# Patient Record
Sex: MALE | Employment: FULL TIME | URBAN - METROPOLITAN AREA
[De-identification: names, ages, dates, MRNs, and addresses within clinical notes are randomized per-mention and may not be internally consistent; named-entity substitution may affect disease eponyms.]

---

## 2020-02-14 ENCOUNTER — HOSPITAL ENCOUNTER (OUTPATIENT)
Dept: CARDIAC REHAB | Age: 40
Discharge: HOME | End: 2020-02-14
Payer: COMMERCIAL

## 2020-02-14 VITALS — BODY MASS INDEX: 27.05 KG/M2 | HEIGHT: 69 IN | WEIGHT: 182.6 LBS

## 2020-02-14 PROCEDURE — 93798 PHYS/QHP OP CAR RHAB W/ECG: CPT

## 2020-02-14 RX ORDER — CARVEDILOL 3.12 MG/1
3.12 TABLET ORAL 2 TIMES DAILY WITH MEALS
COMMUNITY

## 2020-02-14 RX ORDER — ASPIRIN 81 MG/1
TABLET ORAL DAILY
COMMUNITY
End: 2022-05-24

## 2020-02-14 RX ORDER — ATORVASTATIN CALCIUM 80 MG/1
80 TABLET, FILM COATED ORAL DAILY
COMMUNITY

## 2020-02-14 RX ORDER — NITROGLYCERIN 0.4 MG/1
0.4 TABLET SUBLINGUAL
COMMUNITY

## 2020-02-14 RX ORDER — METOPROLOL TARTRATE 25 MG/1
TABLET, FILM COATED ORAL 2 TIMES DAILY
COMMUNITY
End: 2022-05-24

## 2020-02-14 NOTE — CARDIO/PULMONARY
Cardiopulmonary Rehab Orientation:     Met with for the initial session. Mr Keli Payne is a 37year-old patient of Dr. Wendy King at [facility], who presents to rehab for cardiac conditioning and strengthening, S/P NSTEMI/stent 1/15/2020. Pt was out of town in [facility] and exhibited chest tightness/ pain. He was stented at [facility] in [place], [place] by Dr. Jaziel Garcia,  LVEF 60% (per pt). History also includes CAD. STEMI/stent, CABG (2016), diverticulitis HTN, HLD. Allergies to Peptobismal, and ibuprofen. Immunization for influenza vaccine UTD. Pt is nonsmoker. He drinks beer 2-3 x week. Lungs were CTA; denied any cough. No LE edema was present. Exercise at home includes [employer] bike program. However he has not exercised since NSTEMI last month    Limitations: none. Patient was given an educational notebook and viewed the cardiac rehab DVD. Psychosocial: Pt denies stress. He enjoys playing music (guitar, piano) he also enjoys reading for stress refief. Pt scored a 1 on PHQ-9 depression  Screening tool. BMI 27.02 , based on height of 5'9\" and weight 182. 6 Lbs. .  TELE: SB/NSR, BBB  Pt completed 6MW on treadmill with no difficulty to 570 meters, METS 3.7. Patient's identified goals are:   1. Lose weight (now 182 lbs)  2. Return to high intensity exercises with a target -140.        Plan: Return for first exercise session on 2/18/2020

## 2020-02-18 ENCOUNTER — HOSPITAL ENCOUNTER (OUTPATIENT)
Dept: CARDIAC REHAB | Age: 40
Discharge: HOME | End: 2020-02-18
Payer: COMMERCIAL

## 2020-02-18 VITALS — BODY MASS INDEX: 26.97 KG/M2 | WEIGHT: 182.6 LBS

## 2020-02-18 PROCEDURE — 93798 PHYS/QHP OP CAR RHAB W/ECG: CPT

## 2020-02-21 ENCOUNTER — HOSPITAL ENCOUNTER (OUTPATIENT)
Dept: CARDIAC REHAB | Age: 40
Discharge: HOME | End: 2020-02-21
Payer: COMMERCIAL

## 2020-02-21 VITALS — WEIGHT: 182 LBS | BODY MASS INDEX: 26.88 KG/M2

## 2020-02-21 PROCEDURE — 93798 PHYS/QHP OP CAR RHAB W/ECG: CPT

## 2020-02-25 ENCOUNTER — HOSPITAL ENCOUNTER (OUTPATIENT)
Dept: CARDIAC REHAB | Age: 40
Discharge: HOME | End: 2020-02-25
Payer: COMMERCIAL

## 2020-02-25 VITALS — WEIGHT: 182.4 LBS | BODY MASS INDEX: 26.94 KG/M2

## 2020-02-25 PROCEDURE — 93798 PHYS/QHP OP CAR RHAB W/ECG: CPT

## 2020-02-28 ENCOUNTER — HOSPITAL ENCOUNTER (OUTPATIENT)
Dept: CARDIAC REHAB | Age: 40
Discharge: HOME | End: 2020-02-28
Payer: COMMERCIAL

## 2020-02-28 VITALS — WEIGHT: 183.2 LBS | BODY MASS INDEX: 27.05 KG/M2

## 2020-02-28 PROCEDURE — 93798 PHYS/QHP OP CAR RHAB W/ECG: CPT

## 2020-03-03 ENCOUNTER — HOSPITAL ENCOUNTER (OUTPATIENT)
Dept: CARDIAC REHAB | Age: 40
Discharge: HOME | End: 2020-03-03
Payer: COMMERCIAL

## 2020-03-03 VITALS — WEIGHT: 183.8 LBS | BODY MASS INDEX: 27.14 KG/M2

## 2020-03-03 PROCEDURE — 93798 PHYS/QHP OP CAR RHAB W/ECG: CPT

## 2020-03-05 ENCOUNTER — HOSPITAL ENCOUNTER (OUTPATIENT)
Dept: CT IMAGING | Age: 40
Discharge: HOME | End: 2020-03-05
Attending: INTERNAL MEDICINE
Payer: COMMERCIAL

## 2020-03-05 DIAGNOSIS — I25.10 CORONARY ARTERIOSCLEROSIS: ICD-10-CM

## 2020-03-05 DIAGNOSIS — R93.3 ABNORMAL FINDINGS ON DIAGNOSTIC IMAGING OF OTHER PARTS OF DIGESTIVE TRACT: ICD-10-CM

## 2020-03-05 DIAGNOSIS — K57.32 DIVERTICULITIS, COLON: ICD-10-CM

## 2020-03-05 LAB — CREAT BLD-MCNC: 0.8 MG/DL (ref 0.6–1.3)

## 2020-03-05 PROCEDURE — 74177 CT ABD & PELVIS W/CONTRAST: CPT

## 2020-03-05 PROCEDURE — 74011000255 HC RX REV CODE- 255: Performed by: INTERNAL MEDICINE

## 2020-03-05 PROCEDURE — 74011636320 HC RX REV CODE- 636/320: Performed by: INTERNAL MEDICINE

## 2020-03-05 PROCEDURE — 82565 ASSAY OF CREATININE: CPT

## 2020-03-05 RX ORDER — SODIUM CHLORIDE 0.9 % (FLUSH) 0.9 %
10 SYRINGE (ML) INJECTION
Status: COMPLETED | OUTPATIENT
Start: 2020-03-05 | End: 2020-03-05

## 2020-03-05 RX ORDER — BARIUM SULFATE 20 MG/ML
900 SUSPENSION ORAL
Status: COMPLETED | OUTPATIENT
Start: 2020-03-05 | End: 2020-03-05

## 2020-03-05 RX ADMIN — Medication 10 ML: at 14:56

## 2020-03-05 RX ADMIN — IOPAMIDOL 100 ML: 755 INJECTION, SOLUTION INTRAVENOUS at 14:55

## 2020-03-05 RX ADMIN — BARIUM SULFATE 900 ML: 21 SUSPENSION ORAL at 14:55

## 2020-03-06 ENCOUNTER — HOSPITAL ENCOUNTER (OUTPATIENT)
Dept: CARDIAC REHAB | Age: 40
Discharge: HOME | End: 2020-03-06
Payer: COMMERCIAL

## 2020-03-06 VITALS — WEIGHT: 182.8 LBS | BODY MASS INDEX: 26.99 KG/M2

## 2020-03-06 PROCEDURE — 93798 PHYS/QHP OP CAR RHAB W/ECG: CPT

## 2020-03-10 ENCOUNTER — HOSPITAL ENCOUNTER (OUTPATIENT)
Dept: CARDIAC REHAB | Age: 40
Discharge: HOME | End: 2020-03-10
Payer: COMMERCIAL

## 2020-03-10 VITALS — BODY MASS INDEX: 27.05 KG/M2 | WEIGHT: 183.2 LBS

## 2020-03-10 PROCEDURE — 93798 PHYS/QHP OP CAR RHAB W/ECG: CPT

## 2020-03-13 ENCOUNTER — APPOINTMENT (OUTPATIENT)
Dept: CARDIAC REHAB | Age: 40
End: 2020-03-13
Payer: COMMERCIAL

## 2020-03-20 ENCOUNTER — APPOINTMENT (OUTPATIENT)
Dept: CARDIAC REHAB | Age: 40
End: 2020-03-20
Payer: COMMERCIAL

## 2020-03-24 ENCOUNTER — APPOINTMENT (OUTPATIENT)
Dept: CARDIAC REHAB | Age: 40
End: 2020-03-24
Payer: COMMERCIAL

## 2020-03-27 ENCOUNTER — APPOINTMENT (OUTPATIENT)
Dept: CARDIAC REHAB | Age: 40
End: 2020-03-27
Payer: COMMERCIAL

## 2020-03-31 ENCOUNTER — APPOINTMENT (OUTPATIENT)
Dept: CARDIAC REHAB | Age: 40
End: 2020-03-31
Payer: COMMERCIAL

## 2020-04-01 NOTE — CARDIO/PULMONARY
Spoke with Daniel [place] on 3- for follow up Cardiopulmonary telephone counseling. Psychosocial: Patient reports having low stressors at this time. Discussed stress management techniques such as music and deep breathing. Smoking: Patient does not have current or recent  tobacco use. Exercise: Discussed patient's current home exercise routine. He is using his peloton daily and would like his workout increased at cardiac rehab. Importance of self monitoring discussed and to not let his heart rate go above 130. Suggested exercise options such as adding some walking as to not get bored with a routine. Reported barriers to exercise at this time include none. Plans to stay active during extended stay at home include continue using peloton and walking. Nutrition:  Patient states he is eating well to include a low residual diet as he is having some problems with diverticulitis at the moment.       Felipe Cowan RN

## 2020-04-03 ENCOUNTER — APPOINTMENT (OUTPATIENT)
Dept: CARDIAC REHAB | Age: 40
End: 2020-04-03

## 2020-04-17 ENCOUNTER — TELEPHONE (OUTPATIENT)
Dept: CARDIAC REHAB | Age: 40
End: 2020-04-17

## 2020-04-17 NOTE — TELEPHONE ENCOUNTER
Attempted to reach patient to review cardiac rehab nutrition therapy during this period of clinic visit closure due to COVID-19. Left call back number. Will try again.

## 2020-08-31 ENCOUNTER — TELEPHONE (OUTPATIENT)
Dept: CARDIAC REHAB | Age: 40
End: 2020-08-31

## 2020-08-31 NOTE — TELEPHONE ENCOUNTER
Spoke with patient via phone. Not interested in returning to the Cardiac Rehab program at this time.

## 2022-05-24 ENCOUNTER — OFFICE VISIT (OUTPATIENT)
Dept: ENDOCRINOLOGY | Age: 42
End: 2022-05-24
Payer: COMMERCIAL

## 2022-05-24 VITALS
SYSTOLIC BLOOD PRESSURE: 109 MMHG | WEIGHT: 182.2 LBS | BODY MASS INDEX: 26.98 KG/M2 | HEIGHT: 69 IN | DIASTOLIC BLOOD PRESSURE: 78 MMHG | HEART RATE: 81 BPM

## 2022-05-24 DIAGNOSIS — R94.6 BORDERLINE ABNORMAL THYROID FUNCTION TEST: Primary | ICD-10-CM

## 2022-05-24 PROCEDURE — 99204 OFFICE O/P NEW MOD 45 MIN: CPT | Performed by: INTERNAL MEDICINE

## 2022-05-24 RX ORDER — LISINOPRIL 10 MG/1
TABLET ORAL
COMMUNITY
Start: 2022-05-02

## 2022-05-24 RX ORDER — CLOPIDOGREL BISULFATE 75 MG/1
75 TABLET ORAL DAILY
COMMUNITY
Start: 2022-05-02

## 2022-05-24 RX ORDER — CHOLECALCIFEROL TAB 125 MCG (5000 UNIT) 125 MCG
5000 TAB ORAL DAILY
COMMUNITY

## 2022-05-24 NOTE — PROGRESS NOTES
Chief Complaint   Patient presents with    Thyroid Problem     pcp and pharmacy confirmed     History of Present Illness: Jalil Shukla) is a 35 y.o. male who is a new patient for thyroid. He made this appointment himself as he does have a FH of thyroid disease. In 8/21, his TSH was 6.2 and this was repeated in 9/21 and TSH 5.61 with a normal FT4 of 0.97. Most recently in 2/22 his TSH was 4.73 and FT4 1.04 and T3 was normal at 105. His total testosterone was normal at 354 and free T was normal at 16.5. No anterior neck pain or swelling. Has had intermittent fatigue. Bowels used to be looser until taking metamucil and now they are regular. Tends to stay hot at times but other times can feel colder especially since the heart disease diagnosis. Does not have dry skin but does have some rosacea. Does have some nail ridging. Weight is stable over the past 2 years but has been doing more strength training and has less fat mass and more muscle mass. Sometimes has some muscle spasms in his abdomen and occasionally in his upper back. Yoga does help. Occasionally will have tingling over his left side of his abdomen. Past Medical History:   Diagnosis Date    Anxiety     CAD (coronary artery disease)     MI at age 43 and age 37, s/p 5 stents    DDD (degenerative disc disease), lumbar     Diverticulitis     takes metamucil for this    Hyperlipidemia LDL goal <70     Hypertension     Rosacea     Scapular dyskinesis      Past Surgical History:   Procedure Laterality Date    HX CORONARY ARTERY BYPASS GRAFT  2017    1v around LAD     Current Outpatient Medications   Medication Sig    clopidogreL (PLAVIX) 75 mg tab Take 75 mg by mouth daily. lisinopriL (PRINIVIL, ZESTRIL) 10 mg tablet     psyllium husk (METAMUCIL PO) Take  by mouth daily (with lunch). cholecalciferol (VITAMIN D3) (5000 Units/125 mcg) tab tablet Take 5,000 Units by mouth daily.     docosahexaenoic acid/epa (FISH OIL PO) Take 2 Caplet by mouth daily. carvediloL (COREG) 3.125 mg tablet Take 3.125 mg by mouth two (2) times daily (with meals). atorvastatin (LIPITOR) 80 mg tablet Take 80 mg by mouth daily. nitroglycerin (NITROSTAT) 0.4 mg SL tablet 0.4 mg by SubLINGual route every five (5) minutes as needed for Chest Pain. Up to 3 doses. No current facility-administered medications for this visit. Allergies   Allergen Reactions    Ibuprofen Hives    Pepto-Bismol [Bismuth Subsalicylate] Nausea and Vomiting     Family History   Problem Relation Age of Onset    Dementia Mother     Alcohol abuse Father         with pancreatitis and needed surgery on his pancreas    Dementia Maternal Grandmother     Thyroid Disease Cousin      Social History     Socioeconomic History    Marital status:      Spouse name: Not on file    Number of children: 2    Years of education: Not on file    Highest education level: Not on file   Occupational History    Occupation: [employer]   Tobacco Use    Smoking status: Never Smoker    Smokeless tobacco: Never Used   Substance and Sexual Activity    Alcohol use: Yes     Alcohol/week: 1.0 standard drink     Types: 1 Cans of beer per week     Comment: 2-3 drinks 3 times week    Drug use: Never    Sexual activity: Not on file   Other Topics Concern     Service Not Asked    Blood Transfusions Not Asked    Caffeine Concern Not Asked    Occupational Exposure Not Asked    Hobby Hazards Not Asked    Sleep Concern Not Asked    Stress Concern Not Asked    Weight Concern Not Asked    Special Diet Not Asked    Back Care Not Asked    Exercise Not Asked    Bike Helmet Not Asked    Seat Belt Not Asked    Self-Exams Not Asked   Social History Narrative    Lives in [place] with his wife and 1 and 3 yo daughters. 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        Social Determinants of Health     Financial Resource Strain:     Difficulty of Paying Living Expenses: Not on file   Food Insecurity:     Worried About [address] in the Last Year: Not on file    Joceline of Food in the Last Year: Not on file   Transportation Needs:     Lack of Transportation (Medical): Not on file    Lack of Transportation (Non-Medical): Not on file   Physical Activity:     Days of Exercise per Week: Not on file    Minutes of Exercise per Session: Not on file   Stress:     Feeling of Stress : Not on file   Social Connections:     Frequency of Communication with Friends and Family: Not on file    Frequency of Social Gatherings with Friends and Family: Not on file    Attends Restorationism Services: Not on file    Active Member of Clubs or Organizations: Not on file    Attends Club or Organization Meetings: Not on file    Marital Status: Not on file   Intimate Partner Violence:     Fear of Current or Ex-Partner: Not on file    Emotionally Abused: Not on file    Physically Abused: Not on file    Sexually Abused: Not on file   Housing Stability:     Unable to Pay for Housing in the Last Year: Not on file    Number of [place] in the Last Year: Not on file    Unstable Housing in the Last Year: Not on file     Review of Systems: per HPI    Physical Examination:  Blood pressure 109/78, pulse 81, height 5' 9\" (1.753 m), weight 182 lb 3.2 oz (82.6 kg).   General: pleasant, no distress, good eye contact  HEENT: no exopthalmos, no periorbital edema, no scleral/conjunctival injection, EOMI,   Neck: supple, small goiter without nodules, no masses, lymph nodes, or carotid bruits, no supraclavicular or dorsocervical fat pads  Cardiovascular: regular, normal rate, normal S1 and S2, no murmurs/rubs/gallops   Respiratory: clear to auscultation bilaterally  Gastrointestinal: soft, nontender, nondistended, no masses, no hepatosplenomegaly  Musculoskeletal: no proximal muscle weakness in upper or lower extremities  Integumentary: no acanthosis nigricans, no rashes, no edema  Neurological: reflexes 2+ at biceps, no tremor  Psychiatric: normal mood and affect    Data Reviewed:   - labs as above    Assessment/Plan:   1. Borderline abnormal thyroid function test: He made this appointment himself as he does have a FH of thyroid disease. In 8/21, his TSH was 6.2 and this was repeated in 9/21 and TSH 5.61 with a normal FT4 of 0.97. Most recently in 2/22 his TSH was 4.73 and FT4 1.04 and T3 was normal at 105. He has a small goiter on exam so will screen for Hashimoto's and provided his antibody level is elevated and/or repeat TSH is > 3, will begin a trial of levothyroxine. check TSH, free T4 and Thyroid antibody panel today          Patient Instructions   1) TSH is a thyroid test.  Your level has been in the 4-6 range which is high and above goal of 0.5-2.0. This test goes opposite of your thyroid function and suggests you likely have mild hypothyroidism (slow metabolism) and therefore would benefit from starting a low dose of levothyroxine. I will repeat your labs today along with thyroid antibody levels to screen for Hashimoto's disease which is the most common cause of hypothyroidism in the world and can be genetic. If your antibodies are elevated and/or your TSH remains over 3, I would like to do a trial of thyroid hormone and will pick a dose based on your labs. If we start this, you will take this as follows: Take Levothyroxine either in the morning with just water, at least 30 minutes before breakfast and coffee and all other pills, or take it at bedtime on any empty stomach 2-3 hours after dinner. This must be  from vitamins, calcium, or iron and metamucil by at least 4 hours. If you ever do miss a dose of levothyroxine, it's okay to take 2 pills the next day to catch up on your dose. The goal is always to get 7 pills per week and even if you have to double up several days in a row to make up for missed doses, this is better than letting your weekly level fall.     2) I will send you a message through [address] with your lab results. Please call [phone] to reset your [facility] password. Follow-up and Dispositions    Return in about 6 months (around 11/24/2022). Copy sent to: Kathy Echevarria MD as PCP - General (Family Medicine)  Ab Johnston MD (Cardiovascular Disease Physician)    Lab follow up: 05/26/22  Component      Latest Ref Rng & Units 5/24/2022 5/24/2022 5/24/2022          12:22 PM 12:22 PM 12:22 PM   Thyroid peroxidase Ab      0 - 34 IU/mL 187 (H)     TSH      0.36 - 3.74 uIU/mL   7.36 (H)   T4, Free      0.8 - 1.5 NG/DL  0.8      Sent him the following message through [employer]:  TSH is a thyroid test.  Your level is 7.36 which is high and above goal of 0.45-2.0 and confirms that you do have hypothyroidism and would benefit from starting levothyroxine. I will begin 100 mcg daily and sent this to your local pharmacy. Please take this as directed on the sheet I gave you at your visit.  -------------------------------------------------------------------------------------------------------------------  Your thyroid peroxidase (TPO) antibody is high at 187 (normal is 0-34). This hasn't be released to you yet on [facility] [facility] but I can see the result. Your thyroglobulin antibody level is still pending and once this is back, both results should post to [facility].   This confirms that you do have an autoimmune thyroid disease called Hashimoto's disease and therefore would benefit on taking thyroid hormone the rest of your life and there is a chance your daughters could develop a thyroid condition sometime in the future but not usually until they are adults, though rarely children can get hypothyroidism so just let your pediatrician know you have been diagnosed with this in case there is any change with your children on their growth curve so their doctor can have them checked for a thyroid condition.  -------------------------------------------------------------------------------------------------------------------  I put an order directly into the [facility] system to repeat your labs in 6 weeks so just ask for the order under my name and you will receive a courtesy reminder through [employer] to have these drawn and I'll be in touch with the results. Lab follow up: 05/29/22  Component      Latest Ref Rng & Units 5/24/2022          12:22 PM   Thyroglobulin Ab      0.0 - 0.9 IU/mL 4.2 (H)     Sent him the following message through [employer]:  Your thyroglobulin antibody came back high at 4.2 and this antibody is also elevated with Hashimoto's, again confirming you have this condition but we won't follow your antibody levels over time and I will adjust your dose of levothyroxine based on your symptoms and TSH and free T4 levels. Lab follow up: 07/07/22  Component      Latest Ref Rng & Units 7/6/2022 7/6/2022          12:00 AM 12:00 AM   T4, Free      0.82 - 1.77 ng/dL  1.43   TSH      0.450 - 4.500 uIU/mL 2.090      Sent him the following message through [employer]:  TSH is a thyroid test.  Your level is 2.09 which is back to normal and down from 7.36 but just above goal of 0.45-2.0. This test goes opposite of your thyroid dose and suggests your dose of levothyroxine is not quite enough. I will increase your dose to 112 mcg daily and have sent a new prescription to your local pharmacy.  -------------------------------------------------------------------------------------------------------------------  I will mail you a lab slip. Please use this to have your labs drawn in the 1-2 weeks prior to your next visit. The order cannot be put directly into the [facility] system due to a scheduled computer upgrade in October that will delete all future orders so please bring the slip to [facility].  Thanks.     Lab follow up: 08/12/22  Component      Latest Ref Rng & Units 8/10/2022 8/10/2022          12:00 AM 12:00 AM T4, Free      0.82 - 1.77 ng/dL  1.74   TSH      0.450 - 4.500 uIU/mL 1.110      Sent him the following message through [employer]:  TSH is a thyroid test.  Your level is 1.11 which is normal and at goal of 0.45-2.0. This test goes opposite of your thyroid dose and suggests your dose of levothyroxine is actually just right and not too much. However, if you felt better on the 100 mcg dose, we could go back to this. Alternatively, we can stay on this dose and give it a little more time or we could even try taking 1 tab on Mon-Sat and 1/2 tab on Sun which is the equivalent of 104 mcg/day which is more than 100 but less than your current dose of 112 mcg. Let me know what you want to do with your dose. Addendum: 08/13/22  We had the following [employer] exchange:    Sounds good. Thanks for letting me know.  ===View-only below this line===      ----- Message -----       From:Noble Gunn       Sent:8/12/2022  2:21 PM EDT         To:Gavino Archer MD    Subject:lab results    Thanks. I think Ill just stick with the current dose for now. I had been exercising again and some dieting and they may have made me feel much more energy than usual. Feel pretty good overall. Ill keep monitoring sleep and continue on until our next appointment unless something changes. Appreciate it! Lab follow up: 10/11/22  Received labs from PCP:  - TSH 1.35  - FT4 1.72  - glucose 101    Sent him the following message through [employer]:  TSH is a thyroid test.  Your level is 1.35 which is normal and still at goal of 0.45-2.0 and stable from 1.1 at last check and your free T4 is normal at 1.72 and stable from 1.74 in 8/22. This test goes opposite of your thyroid dose and suggests your dose of levothyroxine is still perfect so I will keep your dose the same.  -------------------------------------------------------------------------------------------------------------------  Your fasting glucose was 101 which is just barely high. Normal is under 100 and full blown diabetes is over 125 so your value is barely in the borderline diabetic range. I recommend you start cutting back on your portions of starchy foods and drinks such as bread, rice, potato, pasta, fruit, sweets, regular soda, sweet tea, juice and any other sweetened beverages. Try not to eat more than 45 grams of carbs per meal (1 palm/fist sized serving = 30 grams; carbs are potatoes, rice, pasta, bread, fruit, corn, peas, cereal, desserts). Your PCP can continue to follow this over time and you can ask to have a Hemoglobin A1c (3 month test of blood sugar) drawn with future labs to get another picture of how your sugar is running over 3 months instead of just the past 24 hours, which is all the fasting sugar is telling you.

## 2022-05-24 NOTE — PATIENT INSTRUCTIONS
1) TSH is a thyroid test.  Your level has been in the 4-6 range which is high and above goal of 0.5-2.0. This test goes opposite of your thyroid function and suggests you likely have mild hypothyroidism (slow metabolism) and therefore would benefit from starting a low dose of levothyroxine. I will repeat your labs today along with thyroid antibody levels to screen for Hashimoto's disease which is the most common cause of hypothyroidism in the world and can be genetic. If your antibodies are elevated and/or your TSH remains over 3, I would like to do a trial of thyroid hormone and will pick a dose based on your labs. If we start this, you will take this as follows: Take Levothyroxine either in the morning with just water, at least 30 minutes before breakfast and coffee and all other pills, or take it at bedtime on any empty stomach 2-3 hours after dinner. This must be  from vitamins, calcium, or iron and metamucil by at least 4 hours. If you ever do miss a dose of levothyroxine, it's okay to take 2 pills the next day to catch up on your dose. The goal is always to get 7 pills per week and even if you have to double up several days in a row to make up for missed doses, this is better than letting your weekly level fall. 2) I will send you a message through [employer] with your lab results. Please call [phone] to reset your [employer] password.

## 2022-05-26 PROBLEM — E06.3 HASHIMOTO'S DISEASE: Status: ACTIVE | Noted: 2022-05-01

## 2022-05-26 RX ORDER — LEVOTHYROXINE SODIUM 100 UG/1
100 TABLET ORAL
Qty: 90 TABLET | Refills: 3 | Status: SHIPPED | OUTPATIENT
Start: 2022-05-26 | End: 2022-07-07

## 2022-07-07 DIAGNOSIS — R94.6 BORDERLINE ABNORMAL THYROID FUNCTION TEST: ICD-10-CM

## 2022-07-07 LAB
T4 FREE SERPL-MCNC: 1.43 NG/DL (ref 0.82–1.77)
TSH SERPL DL<=0.005 MIU/L-ACNC: 2.09 UIU/ML (ref 0.45–4.5)

## 2022-07-07 RX ORDER — LEVOTHYROXINE SODIUM 112 UG/1
112 TABLET ORAL
Qty: 90 TABLET | Refills: 3 | Status: SHIPPED | OUTPATIENT
Start: 2022-07-07

## 2022-08-06 DIAGNOSIS — R94.6 BORDERLINE ABNORMAL THYROID FUNCTION TEST: Primary | ICD-10-CM

## 2022-08-12 LAB
T4 FREE SERPL-MCNC: 1.74 NG/DL (ref 0.82–1.77)
TSH SERPL DL<=0.005 MIU/L-ACNC: 1.11 UIU/ML (ref 0.45–4.5)

## 2022-12-06 ENCOUNTER — OFFICE VISIT (OUTPATIENT)
Dept: ENDOCRINOLOGY | Age: 42
End: 2022-12-06
Payer: COMMERCIAL

## 2022-12-06 VITALS
WEIGHT: 182 LBS | HEART RATE: 82 BPM | SYSTOLIC BLOOD PRESSURE: 134 MMHG | HEIGHT: 69 IN | DIASTOLIC BLOOD PRESSURE: 91 MMHG | BODY MASS INDEX: 26.96 KG/M2

## 2022-12-06 DIAGNOSIS — E06.3 HASHIMOTO'S DISEASE: Primary | ICD-10-CM

## 2022-12-06 PROCEDURE — 99214 OFFICE O/P EST MOD 30 MIN: CPT | Performed by: INTERNAL MEDICINE

## 2022-12-06 RX ORDER — LEVOTHYROXINE SODIUM 112 UG/1
112 TABLET ORAL
Qty: 14 TABLET | Refills: 0 | Status: SHIPPED | COMMUNITY
Start: 2022-12-06 | End: 2022-12-06 | Stop reason: SDUPTHER

## 2022-12-06 RX ORDER — LEVOTHYROXINE SODIUM 112 UG/1
112 TABLET ORAL
Qty: 90 TABLET | Refills: 3 | Status: SHIPPED | OUTPATIENT
Start: 2022-12-06

## 2022-12-06 NOTE — PATIENT INSTRUCTIONS
1) We will try brand unithroid at the same dose of 112 mcg daily for the next 2 weeks. If you are feeling better, then go and fill the prescription and take the co-pay card with you. 2) Plan on repeating your labs in 6-8 weeks to see the effect of the brand medication and then go again prior to your visit in 6 months.

## 2022-12-06 NOTE — PROGRESS NOTES
Chief Complaint   Patient presents with    Thyroid Problem     PCP and pharmacy confirmed     History of Present Illness: Shreyas Appiah is a 38 y.o. male here for follow up of thyroid. Weight stable since last visit in 5/22. Has had more energy and doesn't feel as aggressive as when we first went up from 100 to 112 mcg daily over the summer. Has noticed an increase in his rosacea since being on thyroid hormone. Initially did have some improvement in nail ridging but over the past 2 weeks this is starting to come back. Bowels are more regular and doesn't have bloating. Does have some increase in sex drive. Feels his muscle strength is better. Has had some sinus headache the past 2 days and took a decongestant that raised his BP. Does have some joint pains and plans on asking his cardiologist about switching from lipitor to crestor. Current Outpatient Medications   Medication Sig    levothyroxine (SYNTHROID) 112 mcg tablet Take 1 Tablet by mouth Daily (before breakfast). Delete 100 mcg dose from profile    clopidogreL (PLAVIX) 75 mg tab Take 75 mg by mouth daily. lisinopriL (PRINIVIL, ZESTRIL) 10 mg tablet     psyllium husk (METAMUCIL PO) Take  by mouth daily (with lunch). cholecalciferol (VITAMIN D3) (5000 Units/125 mcg) tab tablet Take 5,000 Units by mouth daily. docosahexaenoic acid/epa (FISH OIL PO) Take 2 Caplet by mouth daily. carvediloL (COREG) 3.125 mg tablet Take 3.125 mg by mouth two (2) times daily (with meals). atorvastatin (LIPITOR) 80 mg tablet Take 80 mg by mouth daily. nitroglycerin (NITROSTAT) 0.4 mg SL tablet 0.4 mg by SubLINGual route every five (5) minutes as needed for Chest Pain. Up to 3 doses. No current facility-administered medications for this visit.      Allergies   Allergen Reactions    Ibuprofen Hives    Pepto-Bismol [Bismuth Subsalicylate] Nausea and Vomiting     Review of Systems: PER HPI    Physical Examination:  Blood pressure (!) 134/91, pulse 82, height 5' 9\" (1.753 m), weight 182 lb (82.6 kg). General: pleasant, no distress, good eye contact   Neck: small goiter  Cardiovascular: regular, normal rate, nl s1 and s2, no m/r/g   Respiratory: clear to auscultation bilaterally   Integumentary: skin is normal, no edema  Neurological: reflexes 2+ at biceps, no tremors  Psychiatric: normal mood and affect    Data Reviewed:   - none new for review    Assessment/Plan:   1. Hashimoto's disease: He made this appointment himself as he does have a FH of thyroid disease. In 8/21, his TSH was 6.2 and this was repeated in 9/21 and TSH 5.61 with a normal FT4 of 0.97. Most recently in 2/22 his TSH was 4.73 and FT4 1.04 and T3 was normal at 105. He has a small goiter on exam and at initial visit with me in 5/22 his TSH was 7.36 and TPO ab 187 and TG ab 4.2 confirming he has Hashimoto's and began levothyroxine 100 mcg daily. TSH 2.09 in 7/22 and increased to 112 mcg daily and TSH 1.11 in 8/22 and 1.35 in 10/22 so kept dose the same. However in 12/22 having some nail ridging and increase in rosacea that could be from the generic so changed to brand unithroid to give better regulation of his levels. - change to unithroid 112 mcg daily  - check TSH and free T4 in 6 weeks and prior to next visit            Patient Instructions   1) We will try brand unithroid at the same dose of 112 mcg daily for the next 2 weeks. If you are feeling better, then go and fill the prescription and take the co-pay card with you. 2) Plan on repeating your labs in 6-8 weeks to see the effect of the brand medication and then go again prior to your visit in 6 months. Follow-up and Dispositions    Return in about 6 months (around 6/6/2023). Copy sent to:   Sally Estrada MD as PCP - General (Family Medicine)  Edvin Loaiza MD (Cardiovascular Disease Physician)    Addendum: 12/08/22  Received a note from Dr. Cheyanne Ramírez that lipitor was stopped and changed to crestor 20 mg daily. Updated med list with this change.

## 2022-12-08 RX ORDER — ROSUVASTATIN CALCIUM 20 MG/1
20 TABLET, COATED ORAL
COMMUNITY

## 2023-01-17 DIAGNOSIS — E06.3 HASHIMOTO'S DISEASE: ICD-10-CM

## 2023-04-22 DIAGNOSIS — E06.3 HASHIMOTO'S DISEASE: Primary | ICD-10-CM

## 2023-04-23 DIAGNOSIS — E06.3 HASHIMOTO'S DISEASE: Primary | ICD-10-CM

## 2023-05-20 DIAGNOSIS — E06.3 HASHIMOTO'S DISEASE: ICD-10-CM

## 2023-05-24 RX ORDER — LEVOTHYROXINE SODIUM 112 UG/1
112 TABLET ORAL
COMMUNITY
Start: 2022-12-06

## 2023-05-24 RX ORDER — NITROGLYCERIN 0.4 MG/1
0.4 TABLET SUBLINGUAL
COMMUNITY

## 2023-05-24 RX ORDER — LISINOPRIL 10 MG/1
TABLET ORAL
COMMUNITY
Start: 2022-05-02

## 2023-05-24 RX ORDER — ROSUVASTATIN CALCIUM 20 MG/1
20 TABLET, COATED ORAL NIGHTLY
COMMUNITY

## 2023-05-24 RX ORDER — CLOPIDOGREL BISULFATE 75 MG/1
75 TABLET ORAL DAILY
COMMUNITY
Start: 2022-05-02

## 2023-05-24 RX ORDER — CARVEDILOL 3.12 MG/1
3.12 TABLET ORAL 2 TIMES DAILY WITH MEALS
COMMUNITY

## 2023-08-14 DIAGNOSIS — E06.3 HASHIMOTO'S DISEASE: Primary | ICD-10-CM

## 2023-08-14 RX ORDER — LEVOTHYROXINE SODIUM 125 UG/1
125 TABLET ORAL DAILY
Qty: 90 TABLET | Refills: 3 | Status: SHIPPED | OUTPATIENT
Start: 2023-08-14

## 2023-10-09 DIAGNOSIS — E06.3 HASHIMOTO'S DISEASE: ICD-10-CM

## 2023-10-21 LAB — T4 FREE SERPL-MCNC: 1.74 NG/DL (ref 0.82–1.77)

## 2023-10-23 ENCOUNTER — OFFICE VISIT (OUTPATIENT)
Age: 43
End: 2023-10-23
Payer: COMMERCIAL

## 2023-10-23 VITALS
DIASTOLIC BLOOD PRESSURE: 86 MMHG | SYSTOLIC BLOOD PRESSURE: 123 MMHG | BODY MASS INDEX: 26.39 KG/M2 | HEART RATE: 72 BPM | HEIGHT: 69 IN | WEIGHT: 178.2 LBS

## 2023-10-23 DIAGNOSIS — E06.3 HASHIMOTO'S DISEASE: Primary | ICD-10-CM

## 2023-10-23 PROCEDURE — 99214 OFFICE O/P EST MOD 30 MIN: CPT | Performed by: INTERNAL MEDICINE

## 2023-10-23 RX ORDER — AZELASTINE HYDROCHLORIDE, FLUTICASONE PROPIONATE 137; 50 UG/1; UG/1
SPRAY, METERED NASAL DAILY
COMMUNITY
Start: 2023-10-03

## 2023-10-23 RX ORDER — DESLORATADINE 5 MG/1
TABLET ORAL DAILY
COMMUNITY
Start: 2023-10-03

## 2023-10-23 NOTE — PROGRESS NOTES
Chief Complaint   Patient presents with    Thyroid Problem     PCP and pharmacy confirmed      History of Present Illness: Jewels Nj is a 37 y.o. male here for follow up of thyroid. Weight down 4 lbs since last visit in 12/22. Has been taking the higher dose of unithroid 125 mcg daily since 8/23. Has had more fatigue. He is  from his wife and has 2 small kids but doesn't feel depressed from this. Does have some stress at work. Doesn't feel he is able to cope with things as well the past 3 weeks. Did have improvement in nail ridging but recently this has returned. Is just getting over a URI/sinus infection. Describes a \"crashing\" feeling over the past 3 weeks. No chest pain. Did have an episode about a week ago after having more ETOH than normal where he felt his BP and pulse were rising and took an extra dose of coreg that helped. Had another episode of palpitations last week lasting about 30 minutes. Some of this feels like an anxiety attack and took a xanax and this helped. No tremors. Bowels are regular. Did an hour of strength training yesterday without issue. No heat intolerance but has a few night sweats.         Current Outpatient Medications   Medication Sig    desloratadine (CLARINEX) 5 MG tablet daily    Azelastine-Fluticasone 137-50 MCG/ACT SUSP daily    UNITHROID 125 MCG tablet Take 1 tablet by mouth Daily BRAND MEDICALLY NECESSARY--Delete 112 mcg dose from profile    carvedilol (COREG) 3.125 MG tablet Take 1 tablet by mouth 2 times daily (with meals)    vitamin D3 (CHOLECALCIFEROL) 125 MCG (5000 UT) TABS tablet Take 1 tablet by mouth daily    clopidogrel (PLAVIX) 75 MG tablet Take 1 tablet by mouth daily    lisinopril (PRINIVIL;ZESTRIL) 10 MG tablet ceived the following from [facility]: Outside name: lisinopriL (PRINIVIL, ZESTRIL) 10 mg tablet    nitroGLYCERIN (NITROSTAT) 0.4 MG SL tablet Place 1 tablet under the tongue    rosuvastatin (CRESTOR) 20

## 2023-10-23 NOTE — PATIENT INSTRUCTIONS
1) Your TSH (thyroid test) is not back yet. I had to add this on to your labs as [facility] accidentally didn't run this. 2) Please skip your dose of unithroid tomorrow but do take a dose on Wed and I should be back in touch in the next 2-3 days. 3) Whatever dose we settle on, plan on taking for the next 6 weeks and then repeat your labs again. I will send you a message through [employer] with your lab results.

## 2023-10-24 LAB — SPECIMEN STATUS REPORT: NORMAL

## 2023-10-25 LAB — TSH SERPL DL<=0.005 MIU/L-ACNC: 1.75 UIU/ML (ref 0.45–4.5)

## 2023-10-26 RX ORDER — LEVOTHYROXINE SODIUM 125 UG/1
125 TABLET ORAL DAILY
Qty: 90 TABLET | Refills: 3
Start: 2023-10-26

## 2023-12-04 DIAGNOSIS — E06.3 HASHIMOTO'S DISEASE: ICD-10-CM

## 2024-04-10 DIAGNOSIS — E06.3 HASHIMOTO'S DISEASE: ICD-10-CM

## 2024-04-22 ENCOUNTER — OFFICE VISIT (OUTPATIENT)
Age: 44
End: 2024-04-22
Payer: COMMERCIAL

## 2024-04-22 VITALS
HEART RATE: 60 BPM | WEIGHT: 180.2 LBS | SYSTOLIC BLOOD PRESSURE: 115 MMHG | BODY MASS INDEX: 26.69 KG/M2 | DIASTOLIC BLOOD PRESSURE: 69 MMHG | HEIGHT: 69 IN

## 2024-04-22 DIAGNOSIS — E06.3 HASHIMOTO'S DISEASE: Primary | ICD-10-CM

## 2024-04-22 PROCEDURE — 99213 OFFICE O/P EST LOW 20 MIN: CPT | Performed by: INTERNAL MEDICINE

## 2024-04-22 RX ORDER — LOSARTAN POTASSIUM 50 MG/1
50 TABLET ORAL DAILY
COMMUNITY
Start: 2024-04-06

## 2024-04-22 RX ORDER — CARVEDILOL 6.25 MG/1
6.25 TABLET ORAL 2 TIMES DAILY WITH MEALS
COMMUNITY

## 2024-04-22 RX ORDER — LEVOTHYROXINE SODIUM 125 UG/1
125 TABLET ORAL DAILY
Qty: 90 TABLET | Refills: 3
Start: 2024-04-22

## 2024-04-22 NOTE — PROGRESS NOTES
unithroid is working well but if you would like to take 1 tab daily and skip the 1st and 15th of each month instead of taking 6.5 tabs/week, you can do so.           Return in about 6 months (around 10/22/2024).     Copy sent to:  Fidel Sifuentes MD Johns, Mark E, MD (Cardiovascular Disease Physician)

## 2024-04-22 NOTE — PATIENT INSTRUCTIONS
1) TSH is a thyroid test.  Your level is 0.9 which is normal and at goal of 0.45-2.0.  The TSH goes opposite of your thyroid dose and suggests your dose of unithroid is working well but if you would like to take 1 tab daily and skip the 1st and 15th of each month instead of taking 6.5 tabs/week, you can do so.

## 2024-08-10 RX ORDER — LEVOTHYROXINE SODIUM 125 UG/1
125 TABLET ORAL DAILY
Qty: 90 TABLET | Refills: 3 | Status: SHIPPED | OUTPATIENT
Start: 2024-08-10

## 2024-10-28 DIAGNOSIS — E06.3 HASHIMOTO'S DISEASE: Primary | ICD-10-CM

## 2024-10-29 ENCOUNTER — TELEPHONE (OUTPATIENT)
Age: 44
End: 2024-10-29

## 2024-10-29 NOTE — TELEPHONE ENCOUNTER
Since he didn't read the message I sent yesterday through [employer] I called and spoke with him and read it to him:    I put an order directly into the [facility] system to repeat your labs now so just ask for the order under my name. You won't need to fast for your labs so it's fine to eat the day of your lab draw.   -------------------------------------------------------------------------------------------------------------------  He is on a work trip in [place] and hopes to be back tomorrow and will get them drawn tomorrow or on Thursday.  he voiced understanding of this plan.

## 2024-11-01 LAB
T4 FREE SERPL-MCNC: 1.65 NG/DL (ref 0.82–1.77)
TSH SERPL DL<=0.005 MIU/L-ACNC: 3.3 UIU/ML (ref 0.45–4.5)

## 2024-11-04 ENCOUNTER — OFFICE VISIT (OUTPATIENT)
Age: 44
End: 2024-11-04
Payer: COMMERCIAL

## 2024-11-04 VITALS
BODY MASS INDEX: 26.39 KG/M2 | HEIGHT: 69 IN | HEART RATE: 72 BPM | SYSTOLIC BLOOD PRESSURE: 136 MMHG | WEIGHT: 178.2 LBS | DIASTOLIC BLOOD PRESSURE: 68 MMHG

## 2024-11-04 DIAGNOSIS — E06.3 HASHIMOTO'S DISEASE: Primary | ICD-10-CM

## 2024-11-04 PROCEDURE — 99214 OFFICE O/P EST MOD 30 MIN: CPT | Performed by: INTERNAL MEDICINE

## 2024-11-04 RX ORDER — LEVOTHYROXINE SODIUM 125 UG/1
125 TABLET ORAL DAILY
Qty: 90 TABLET | Refills: 3 | Status: SHIPPED | OUTPATIENT
Start: 2024-11-04

## 2024-11-04 NOTE — PATIENT INSTRUCTIONS
1) TSH is a thyroid test.  Your level is 3.3 which is normal but above my goal of 0.45-2.0.  The TSH goes opposite of your thyroid dose and suggests your dose of unithroid is likely adequate but because of some missed doses in the past 2 months, I will keep your dose the same and focus on compliance and if you miss a dose, double up the next day and keep skipping the 1st and 15th of each month.     2) Please repeat your labs in 2 months.   I will send you a message through [facility] with your lab results.

## 2025-01-04 DIAGNOSIS — E06.3 HASHIMOTO'S DISEASE: ICD-10-CM

## 2025-01-10 LAB — PSA, EXTERNAL: 0.6

## 2025-03-07 ENCOUNTER — PATIENT MESSAGE (OUTPATIENT)
Age: 45
End: 2025-03-07

## 2025-03-07 DIAGNOSIS — E06.3 HASHIMOTO'S DISEASE: Primary | ICD-10-CM

## 2025-03-15 ENCOUNTER — RESULTS FOLLOW-UP (OUTPATIENT)
Age: 45
End: 2025-03-15

## 2025-03-15 LAB
T4 FREE SERPL-MCNC: 1.77 NG/DL (ref 0.82–1.77)
TSH SERPL DL<=0.005 MIU/L-ACNC: 2.53 UIU/ML (ref 0.45–4.5)

## 2025-04-22 DIAGNOSIS — E06.3 HASHIMOTO'S DISEASE: ICD-10-CM

## 2025-05-09 ENCOUNTER — TELEPHONE (OUTPATIENT)
Age: 45
End: 2025-05-09

## 2025-05-09 NOTE — TELEPHONE ENCOUNTER
Pt called requesting to have lab orders faxed to [facility]. Orders were then faxed to the request lab.

## 2025-05-10 LAB
T4 FREE SERPL-MCNC: 1.54 NG/DL (ref 0.82–1.77)
TSH SERPL DL<=0.005 MIU/L-ACNC: 1.22 UIU/ML (ref 0.45–4.5)

## 2025-05-12 ENCOUNTER — OFFICE VISIT (OUTPATIENT)
Age: 45
End: 2025-05-12
Payer: COMMERCIAL

## 2025-05-12 ENCOUNTER — RESULTS FOLLOW-UP (OUTPATIENT)
Age: 45
End: 2025-05-12

## 2025-05-12 VITALS
WEIGHT: 182.3 LBS | HEIGHT: 69 IN | DIASTOLIC BLOOD PRESSURE: 76 MMHG | HEART RATE: 62 BPM | SYSTOLIC BLOOD PRESSURE: 116 MMHG | BODY MASS INDEX: 27 KG/M2

## 2025-05-12 DIAGNOSIS — E06.3 HASHIMOTO'S DISEASE: Primary | ICD-10-CM

## 2025-05-12 PROCEDURE — 99213 OFFICE O/P EST LOW 20 MIN: CPT | Performed by: INTERNAL MEDICINE

## 2025-05-12 NOTE — PATIENT INSTRUCTIONS
1) Your TSH (thyroid test) is perfect so I will keep your dose the same.    2) Please repeat your labs in 2 months.   I will send you a message through [employer] with your lab results.

## 2025-05-12 NOTE — PROGRESS NOTES
Chief Complaint   Patient presents with    Thyroid Problem    Medication Refill     No refills are needed at this time.     [facility]    Other     Patient has given his consent for his provider to use [facility] during today's visit       History of Present Illness: Eugene Bedoya) is a 44 y.o. male here for follow up of thyroid.  Weight up 4 lbs since last visit in 11/24.      History of Present Illness  The patient is a 44-year-old male here for a follow-up of his thyroid condition.    He has been adhering to his prescribed regimen of unithroid 137 mcg of medication daily, without any missed doses. Since starting this regimen, he reports an overall improvement in his well-being, with physiological signs such as normal nail growth being observed. His TSH levels have improved, with the most recent lab result showing a TSH of 1.2. He also notes that his bowel movements have been regular and he does not experience constipation. His sleep quality has improved since December 2024, although it remains suboptimal. He maintains a high-fiber diet and takes Metamucil regularly, ensuring it does not interfere with his thyroid medication.     He has been diligent with his supplement regimen, which includes Athletic Greens and vitamin D. Turmeric has significantly helped with his pain management. He has a prescription for oxycodone, which he takes occasionally, but it does not resolve his pain issues. Tylenol has also been ineffective. He expresses curiosity about the potential benefits of thyroid removal on his overall wellness.    SOCIAL HISTORY  He got  from his wife. He is changing jobs right now. He was with [employer] with [employer] and he is going to work at [employer] on 06/02/2025.  His daughter is still receiving consolidation chemo for her leukemia.  He is a big [employer] fan and put money on them to win the cup.      Current Outpatient Medications   Medication Sig    UNITHROID 137 MCG tablet Take 1 tablet by mouth Daily

## 2025-07-12 DIAGNOSIS — E06.3 HASHIMOTO'S DISEASE: ICD-10-CM

## 2025-07-26 LAB
T4 FREE SERPL-MCNC: 1.77 NG/DL (ref 0.82–1.77)
TSH SERPL DL<=0.005 MIU/L-ACNC: 2.04 UIU/ML (ref 0.45–4.5)